# Patient Record
Sex: FEMALE | Race: OTHER | URBAN - METROPOLITAN AREA
[De-identification: names, ages, dates, MRNs, and addresses within clinical notes are randomized per-mention and may not be internally consistent; named-entity substitution may affect disease eponyms.]

---

## 2023-03-21 ENCOUNTER — OFFICE VISIT (OUTPATIENT)
Dept: FAMILY MEDICINE CLINIC | Age: 48
End: 2023-03-21

## 2023-03-21 VITALS
HEIGHT: 65 IN | WEIGHT: 193 LBS | BODY MASS INDEX: 32.15 KG/M2 | SYSTOLIC BLOOD PRESSURE: 121 MMHG | HEART RATE: 74 BPM | TEMPERATURE: 98.1 F | DIASTOLIC BLOOD PRESSURE: 77 MMHG | OXYGEN SATURATION: 98 %

## 2023-03-21 DIAGNOSIS — R10.32 LEFT LOWER QUADRANT ABDOMINAL PAIN: Primary | ICD-10-CM

## 2023-03-21 DIAGNOSIS — M85.649 BONE CYST OF HAND: ICD-10-CM

## 2023-03-21 LAB
BILIRUB UR QL STRIP: NEGATIVE
GLUCOSE UR-MCNC: NEGATIVE MG/DL
KETONES P FAST UR STRIP-MCNC: NEGATIVE MG/DL
PH UR STRIP: 5 [PH] (ref 4.6–8)
PROT UR QL STRIP: NEGATIVE
SP GR UR STRIP: 1.03 (ref 1–1.03)
UA UROBILINOGEN AMB POC: NORMAL (ref 0.2–1)
URINALYSIS CLARITY POC: CLEAR
URINALYSIS COLOR POC: YELLOW
URINE BLOOD POC: NORMAL
URINE LEUKOCYTES POC: NEGATIVE
URINE NITRITES POC: NEGATIVE

## 2023-03-21 PROCEDURE — 81002 URINALYSIS NONAUTO W/O SCOPE: CPT | Performed by: NURSE PRACTITIONER

## 2023-03-21 PROCEDURE — 99214 OFFICE O/P EST MOD 30 MIN: CPT | Performed by: NURSE PRACTITIONER

## 2023-03-21 RX ORDER — NAPROXEN 500 MG/1
500 TABLET ORAL 2 TIMES DAILY WITH MEALS
Qty: 60 TABLET | Refills: 0 | Status: SHIPPED | OUTPATIENT
Start: 2023-03-21

## 2023-03-21 NOTE — PROGRESS NOTES
3/21/2023 : Arnette Phoenix En Pickett (: 1975) is a 52 y.o. female,  established patient, here for evaluation of the following chief complaint(s):  Abdominal Pain (Reports that pain started after lifting something heavy at home.) and Nail Problem     ASSESSMENT/PLAN:  Below is the assessment and plan developed based on review of pertinent history, physical exam, labs, studies, and medications. 1. Left lower quadrant abdominal pain  -     AMB POC URINALYSIS DIP STICK MANUAL W/O MICRO  -     US ABD COMP; Future  -     naproxen (NAPROSYN) 500 mg tablet; Take 1 Tablet by mouth two (2) times daily (with meals). , Normal, Disp-60 Tablet, R-0  2. Bone cyst of hand  -     XR 2ND FINGER LT MIN 2 V; Future    Return for return after x-ray and ultrasound. SUBJECTIVE/OBJECTIVE:  HPI   -pain started after lifting something heavy at home last week. It was a 3 seat sofa. She was lifting it by herself. Pain was right after. Nothing makes it worse. Nothing makes it better. Had  6 years ago. But this is a new pain. She does affirm slight relief when she has a bowel movement.  -abd ultrasound    -nail problem  Hurts all around the nail, radiates up the arm,  left index finger. Pain with palpation. Denies injury. When it is cold it is sharp pain. When she puts it in alcohol it feels better. Mild pain with palpation. Has what feels like a bone cyst proximal to the nail bed.   Results for orders placed or performed in visit on 23   AMB POC URINALYSIS DIP STICK MANUAL W/O MICRO   Result Value Ref Range    Color (UA POC) Yellow     Clarity (UA POC) Clear     Glucose (UA POC) Negative Negative    Bilirubin (UA POC) Negative Negative    Ketones (UA POC) Negative Negative    Specific gravity (UA POC) 1.030 1.001 - 1.035    Blood (UA POC) Trace Negative    pH (UA POC) 5 4.6 - 8.0    Protein (UA POC) Negative Negative    Urobilinogen (UA POC) normal 0.2 - 1    Nitrites (UA POC) Negative Negative    Leukocyte esterase (UA POC) Negative Negative     Current Medications:   Current Outpatient Medications   Medication Sig    naproxen (NAPROSYN) 500 mg tablet Take 1 Tablet by mouth two (2) times daily (with meals). Review of Systems: Negative for: fever, chest pain, shortness of breath, leg swelling. Social History:  reports that she has never smoked. She has never used smokeless tobacco. She reports that she does not currently use alcohol. Physical Examination: Patient's last menstrual period was 02/17/2023 (approximate). Blood pressure 121/77, pulse 74, temperature 98.1 °F (36.7 °C), temperature source Temporal, height 5' 4.96\" (1.65 m), weight 193 lb (87.5 kg), last menstrual period 02/17/2023, SpO2 98 %. No hernias appreciated. Mild pain with palpation proximal to dip joint of left index finger. Possible bone cyst, left index finger. General appearance - well developed, no acute distress. Chest - clear to auscultation. Heart - regular rate and rhythm without murmurs, rubs, or gallops. Abdomen - bowel sounds present x 4, NT, ND. No obvious hernia noted. Extremities - no CCE. An electronic signature was used to authenticate this note.   -- Juanpablo Cook NP

## 2023-03-21 NOTE — PROGRESS NOTES
Chief Complaint   Patient presents with    Abdominal Pain     Reports that pain started after lifting something heavy at home.     Nail Problem     Visit Vitals  /77 (BP 1 Location: Left upper arm, BP Patient Position: Sitting, BP Cuff Size: Adult)   Pulse 74   Temp 98.1 °F (36.7 °C) (Temporal)   Ht 5' 4.96\" (1.65 m)   Wt 193 lb (87.5 kg)   LMP 02/17/2023 (Approximate) Comment: Irregular menses   SpO2 98%   BMI 32.16 kg/m²

## 2023-04-17 ENCOUNTER — TELEPHONE (OUTPATIENT)
Dept: FAMILY MEDICINE CLINIC | Facility: CLINIC | Age: 48
End: 2023-04-17

## 2023-04-17 NOTE — TELEPHONE ENCOUNTER
I reviewed the pt's chart. It appears that the xray and U/S were ordered at the pt's last office visit. There is no note from discharge for the day the pt saw the provider. The orders are still active in the chart. I called the pt with Xu Robert , to schedule the U/S for her and explain the xray process to the pt. The pt verified her name and . We explained the Care Card process to to the pt.  [place] [facility] [place] She was infrormed ahe would have to be NPO except for sips of water until the test. The pt is aware and wanted to have her appt at 4:30 pm. Bere Morse RN

## 2023-04-20 ENCOUNTER — HOSPITAL ENCOUNTER (OUTPATIENT)
Dept: ULTRASOUND IMAGING | Age: 48
Discharge: HOME | End: 2023-04-20
Attending: NURSE PRACTITIONER

## 2023-04-20 ENCOUNTER — HOSPITAL ENCOUNTER (OUTPATIENT)
Dept: GENERAL RADIOLOGY | Age: 48
End: 2023-04-20
Attending: NURSE PRACTITIONER

## 2023-04-20 DIAGNOSIS — R10.32 LEFT LOWER QUADRANT ABDOMINAL PAIN: ICD-10-CM

## 2023-04-20 DIAGNOSIS — M85.649 BONE CYST OF HAND: ICD-10-CM

## 2023-04-20 PROCEDURE — 76700 US EXAM ABDOM COMPLETE: CPT

## 2023-04-20 PROCEDURE — 73140 X-RAY EXAM OF FINGER(S): CPT

## 2023-04-21 ENCOUNTER — TELEPHONE (OUTPATIENT)
Dept: FAMILY MEDICINE CLINIC | Age: 48
End: 2023-04-21

## 2023-04-21 NOTE — TELEPHONE ENCOUNTER
Received a VM from [address] [place] [address]) to CBN number 4/20/23 (after 4:30pm). She stated this patient was present on-site for an abdominal ultrasound, but felt it should be a trans-vag ultrasound based on pt's symptoms and current pain. She wanted provider permission to change the order. I called back today 4/21/23 to [phone], no answer. [facility] with call back number.

## 2023-04-26 NOTE — PROGRESS NOTES
CMA attempted x3 to patient. No answer. CMA unable to review results w/ patient. CMA left generic message w/ cvan phone number and requested to press option #2 for nurse.

## 2023-04-27 ENCOUNTER — OFFICE VISIT (OUTPATIENT)
Dept: FAMILY MEDICINE CLINIC | Facility: CLINIC | Age: 48
End: 2023-04-27

## 2023-04-27 VITALS
WEIGHT: 192.6 LBS | OXYGEN SATURATION: 98 % | HEART RATE: 60 BPM | TEMPERATURE: 98.1 F | RESPIRATION RATE: 16 BRPM | HEIGHT: 65 IN | SYSTOLIC BLOOD PRESSURE: 112 MMHG | BODY MASS INDEX: 32.09 KG/M2 | DIASTOLIC BLOOD PRESSURE: 76 MMHG

## 2023-04-27 DIAGNOSIS — K29.00 ACUTE GASTRITIS WITHOUT HEMORRHAGE, UNSPECIFIED GASTRITIS TYPE: ICD-10-CM

## 2023-04-27 DIAGNOSIS — K59.09 OTHER CONSTIPATION: ICD-10-CM

## 2023-04-27 DIAGNOSIS — R10.11 RUQ PAIN: Primary | ICD-10-CM

## 2023-04-27 DIAGNOSIS — Z71.89 COUNSELING AND COORDINATION OF CARE: Primary | ICD-10-CM

## 2023-04-27 PROCEDURE — 99080 SPECIAL REPORTS OR FORMS: CPT | Performed by: PHYSICIAN ASSISTANT

## 2023-04-27 PROCEDURE — 99214 OFFICE O/P EST MOD 30 MIN: CPT | Performed by: NURSE PRACTITIONER

## 2023-04-27 RX ORDER — OMEPRAZOLE 40 MG/1
40 CAPSULE, DELAYED RELEASE ORAL DAILY
Qty: 30 CAPSULE | Refills: 1 | Status: SHIPPED | OUTPATIENT
Start: 2023-04-27

## 2023-04-27 RX ORDER — POLYETHYLENE GLYCOL 3350 17 G/17G
17 POWDER, FOR SOLUTION ORAL 2 TIMES DAILY
Qty: 850 G | Refills: 0 | Status: SHIPPED | OUTPATIENT
Start: 2023-04-27

## 2023-04-27 NOTE — PROGRESS NOTES
BS FA application was completed. Patient will bring it to [facility].   SDOH screening completed. Patient opted out.

## 2023-04-27 NOTE — PROGRESS NOTES
2023 : Casa Kaba (: 1975) is a 52 y.o. female,  established patient, here for evaluation of the following chief complaint(s):  Results (Review US, x-ray results; pt still c/o abd pain + bloating/gas) and Other (Needs to meet w/ ORW for Care Card application (she has it with her))     ASSESSMENT/PLAN:  Below is the assessment and plan developed based on review of pertinent history, physical exam, labs, studies, and medications. 1. RUQ pain  -     REFERRAL TO SURGERY  2. Other constipation  -     polyethylene glycol (MIRALAX) 17 gram/dose powder; Take 17 g by mouth two (2) times a day. Mixed in 8 ounces of water., Normal, Disp-850 g, R-0  3. Acute gastritis without hemorrhage, unspecified gastritis type  -     omeprazole (PRILOSEC) 40 mg capsule; Take 1 Capsule by mouth daily. For abdominal pain., Normal, Disp-30 Capsule, R-1    Return for 1 month LK 20 min abd pain. Finger is still bothering her. It's all in the left hand. Feels stabbing pains occasionally. Negative carpal tunnel compression testing. Abd pain, bloating, gas  SUBJECTIVE/OBJECTIVE:  HPI Has not been able to go to the bathroom for several days with pain left side of abdomen. When she was pregnant she had issues with her gallbladder. Also has pain RUQ. Her ultrasound demonstrated gallstones. She took this morning a remedy for prune juice. No results found for any visits on 23. Current Medications:   Current Outpatient Medications   Medication Sig    polyethylene glycol (MIRALAX) 17 gram/dose powder Take 17 g by mouth two (2) times a day. Mixed in 8 ounces of water. omeprazole (PRILOSEC) 40 mg capsule Take 1 Capsule by mouth daily. For abdominal pain. naproxen (NAPROSYN) 500 mg tablet Take 1 Tablet by mouth two (2) times daily (with meals). Had a drink this weekend of orange juice and celexo (epsom salts) and grapefruit juice and olive oil.   THink this bothered her.  Review of Systems: + constipation. Negative for: fever, chest pain, shortness of breath, leg swelling. Social History:  reports that she has never smoked. She has never used smokeless tobacco. She reports that she does not currently use alcohol. She reports that she does not use drugs. Physical Examination: Patient's last menstrual period was 03/26/2023 (approximate). Blood pressure 112/76, pulse 60, temperature 98.1 °F (36.7 °C), temperature source Temporal, resp. rate 16, height 5' 4.96\" (1.65 m), weight 192 lb 9.6 oz (87.4 kg), last menstrual period 03/26/2023, SpO2 98 %. General appearance - well developed, no acute distress. Chest - clear to auscultation. Heart - regular rate and rhythm without murmurs, rubs, or gallops. Abdomen - bowel sounds present x 4, NT, ND  Extremities - no CCE. An electronic signature was used to authenticate this note.   -- Chris Mcmullen NP

## 2023-04-27 NOTE — PROGRESS NOTES
Chief Complaint   Patient presents with    Results     Review US, x-ray results; pt still c/o abd pain + bloating/gas    Other     Needs to meet w/ ORW for Care Card application (she has it with her)       Visit Vitals  /76 (BP 1 Location: Right upper arm, BP Patient Position: Sitting)   Pulse 60   Temp 98.1 °F (36.7 °C) (Temporal)   Resp 16   Ht 5' 4.96\" (1.65 m)   Wt 192 lb 9.6 oz (87.4 kg)   LMP 03/26/2023 (Approximate) Comment: pt states periods are irregular   SpO2 98%   BMI 32.09 kg/m²     Coordination of Care  1. Have you been to the ER, urgent care clinic since your last visit? Hospitalized since your last visit? No    2. Have you seen or consulted any other health care providers outside of the [address] [place] [address] since your last visit? Include any pap smears or colon screening. No    Does the patient need refills? NO    Learning Assessment Complete?  yes  Depression Screening complete in the past 12 months? yes

## 2023-04-27 NOTE — PROGRESS NOTES
Verified name and . An AVS was printed and given to the patient. Reviewed all discharge instructions, including: new medications, possible side effects, goodRX coupons, A/N referral instructions, attached pt education, and f/up appt. Allowed time for questions and patient verbalized understanding to all given instructions. Patient discharged from clinic in stable condition. I walked patient to Saul Moore, to discuss Care Card application.

## 2023-05-04 ENCOUNTER — OFFICE VISIT (OUTPATIENT)
Dept: FAMILY MEDICINE CLINIC | Age: 48
End: 2023-05-04

## 2023-05-04 DIAGNOSIS — Z71.89 COUNSELING AND COORDINATION OF CARE: Primary | ICD-10-CM

## 2023-05-04 PROCEDURE — 99080 SPECIAL REPORTS OR FORMS: CPT | Performed by: PHYSICIAN ASSISTANT

## 2023-06-01 ENCOUNTER — TELEPHONE (OUTPATIENT)
Age: 48
End: 2023-06-01

## 2023-06-01 NOTE — TELEPHONE ENCOUNTER
The pt called the nurse line. She verified her name and . The pt stated she got a card for insurance in the mail. She was referred to a surgeon but she called them and they told her she did not qualify for the surgeon. The pt was asked what number she called and she stated she called the number on the yellow paper. Then the pt stated they [facility], told her it was in process. The pt was informed I would send the message to the [facility] nurse / ow to make sure the referral was good. She should have been able to call [facility] on or after 23 for an appt. This is what the note in her referral indicated.  Elayen Santiago RN

## 2023-06-06 ENCOUNTER — TELEPHONE (OUTPATIENT)
Age: 48
End: 2023-06-06

## 2023-07-12 ENCOUNTER — OFFICE VISIT (OUTPATIENT)
Age: 48
End: 2023-07-12
Payer: SUBSIDIZED

## 2023-07-12 VITALS
SYSTOLIC BLOOD PRESSURE: 103 MMHG | HEART RATE: 76 BPM | BODY MASS INDEX: 33.12 KG/M2 | OXYGEN SATURATION: 99 % | TEMPERATURE: 98.1 F | HEIGHT: 64 IN | WEIGHT: 194 LBS | RESPIRATION RATE: 16 BRPM | DIASTOLIC BLOOD PRESSURE: 69 MMHG

## 2023-07-12 DIAGNOSIS — K80.20 SYMPTOMATIC CHOLELITHIASIS: Primary | ICD-10-CM

## 2023-07-12 PROCEDURE — 99204 OFFICE O/P NEW MOD 45 MIN: CPT | Performed by: SURGERY

## 2023-07-12 ASSESSMENT — PATIENT HEALTH QUESTIONNAIRE - PHQ9
SUM OF ALL RESPONSES TO PHQ QUESTIONS 1-9: 0
SUM OF ALL RESPONSES TO PHQ QUESTIONS 1-9: 0
SUM OF ALL RESPONSES TO PHQ9 QUESTIONS 1 & 2: 0
SUM OF ALL RESPONSES TO PHQ QUESTIONS 1-9: 0
2. FEELING DOWN, DEPRESSED OR HOPELESS: 0
1. LITTLE INTEREST OR PLEASURE IN DOING THINGS: 0
SUM OF ALL RESPONSES TO PHQ QUESTIONS 1-9: 0

## 2023-07-12 NOTE — PROGRESS NOTES
General Surgery Office Consultation / H & P    CC: RUQ pain  History of Present Illness:      Jamari Razo is a 50 y.o. female who presents with 10 years of RUQ pain. Patient reports over 10 years of right upper quadrant pain that happened after her eldest child's delivery. She has had intermittent pain since then. The pain is in the right upper quadrant and can be sharp at times at about a 7 or so out of 10. Some radiation to her shoulder. Nausea as well. She reports that time improves the pain. Greasy or fatty food provokes the pain but can also happen out of the blue at night. She has been hesitant to proceed with surgery. Strong family history of gallstones requiring gallbladder removal.  History of a . History reviewed. No pertinent past medical history. History reviewed. No pertinent surgical history. History reviewed. No pertinent family history.   Social History     Socioeconomic History    Marital status: Single     Spouse name: None    Number of children: None    Years of education: None    Highest education level: None   Tobacco Use    Smoking status: Never    Smokeless tobacco: Never   Substance and Sexual Activity    Alcohol use: Not Currently    Drug use: Never     Social Determinants of Health     Food Insecurity: Food Insecurity Present    Worried About Running Out of Food in the Last Year: Often true    Ran Out of Food in the Last Year: Never true   Housing Stability: Low Risk     Unable to Pay for Housing in the Last Year: No    Number of Places Lived in the Last Year: 1    Unstable Housing in the Last Year: No      Prior to Admission medications    Not on File     No Known Allergies    Review of Systems:  Constitutional: No fever or chills  Neurologic: No headache  Eyes: No scleral icterus or irritated eyes  Nose: No nasal pain or drainage  Mouth: No oral lesions or sore throat  Cardiac: No palpations or chest pain  Pulmonary: No cough or shortness of